# Patient Record
Sex: FEMALE | Race: WHITE | NOT HISPANIC OR LATINO | Employment: FULL TIME | URBAN - METROPOLITAN AREA
[De-identification: names, ages, dates, MRNs, and addresses within clinical notes are randomized per-mention and may not be internally consistent; named-entity substitution may affect disease eponyms.]

---

## 2023-11-28 ENCOUNTER — TELEPHONE (OUTPATIENT)
Age: 28
End: 2023-11-28

## 2023-11-28 NOTE — TELEPHONE ENCOUNTER
I received a Care Request from patient to schedule for:    Where Does it Hurt? L5/S1 bulge  Are you considering joint replacement? No   Are you seeking a second opinion? No  If yes, who is your doctor? I lvm to cb [facility] at [phone].

## 2023-11-28 NOTE — TELEPHONE ENCOUNTER
Caller: Patient    Doctor/Office: [facility]    Call regarding :  Spoke to patient got more information. States she hurt her back 1 year ago snow boarding did eventually go to a doctor took Xrays no MRI said she bulged a disc. It was never confirmed with MRI, does not having imaging since. Having pain in her right leg to the point she cannot sit. She has had issues with sciatic pain since her teen years as well.       Call was transferred to: [facility]

## 2023-11-29 ENCOUNTER — TELEPHONE (OUTPATIENT)
Dept: PHYSICAL THERAPY | Facility: OTHER | Age: 28
End: 2023-11-29

## 2023-11-29 NOTE — TELEPHONE ENCOUNTER
Patient called into [facility] after hours yesterday 11/28 and left v/m due to her back pain . Returned patient's call today 11/29 . V/M left for patient to call back. Phone number and hours of business provided. This is the 1st attempt to reach the patient. NO REF FOR [facility]. Call was transferred by Ortho.     L5-S1 bulging disc

## 2023-12-04 ENCOUNTER — NURSE TRIAGE (OUTPATIENT)
Dept: PHYSICAL THERAPY | Facility: OTHER | Age: 28
End: 2023-12-04

## 2023-12-04 DIAGNOSIS — M54.50 ACUTE RIGHT-SIDED LOW BACK PAIN, UNSPECIFIED WHETHER SCIATICA PRESENT: Primary | ICD-10-CM

## 2023-12-04 DIAGNOSIS — G89.29 ACUTE EXACERBATION OF CHRONIC LOW BACK PAIN: ICD-10-CM

## 2023-12-04 DIAGNOSIS — M54.50 ACUTE EXACERBATION OF CHRONIC LOW BACK PAIN: ICD-10-CM

## 2023-12-04 NOTE — TELEPHONE ENCOUNTER
Additional Information   Negative: Has the patient had unexplained weight loss? Negative: Does the patient have a fever? Negative: Is the patient experiencing blood in sputum? Negative: Has the patient experienced major trauma? (fall from height, high speed collision, direct blow to spine) and is also experiencing nausea, light-headedness, or loss of consciousness? Negative: Is the patient experiencing urine retention? Negative: Is the patient experiencing acute drop foot or paralysis? Affirmative: Is this a chronic condition? Protocols used: [address] [place] [address]    This RN addressed a few complaints with the patient to r/o the need for ED visit. Patient admits to recent snowboarding trip (last Saturday), but denies any trauma. Increased pain severity and other acute s/s noticed shortly after. Patient is currently working and NO RF s/s. Nurse completed triage and NO RF s/s present. Referral entered for the [facility] site and contact/phone number info given to her as well. Strongly encouraged to schedule an evaluation. Patients information was sent to the preferred site and pt made aware clerical would be calling to schedule appointment. Nurse encouraged her to call site if she does not hear from clerical beforehand. Patient Agreed. Patient did not voice any additional questions or concerns at this time. Patient is aware current complaints, relevant dx, additional referrals and treatment/options will be discussed at the evaluation/consult. All information regarding plan to be evaluated by the therapist was reviewed. Patient is in agreement with nurse's explanation of intended care path discussed today. Nurse also offered a call from the [facility] counselor d/t SBT score. Patient declined. Patient was pleasant and appropriate during this encounter. Patient very appreciative of CB and referral placement for the evaluation with an Advanced Spine Therapist/DPT.      Nurse wished her well and reiterated the benefits of participating in a thorough eval. Patient thanked CS staff for assisting her today.
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
## 2023-12-04 NOTE — TELEPHONE ENCOUNTER
This nurse called the patient to proceed with the triage initiated earlier today. Message left stating reason for call, [facility] contact information, and hours of operation. Encouraged patient to CB if she would like to complete the triage and referral process. Referral closed per protocol and will await possible call-back from the patient.

## 2023-12-06 ENCOUNTER — EVALUATION (OUTPATIENT)
Dept: PHYSICAL THERAPY | Facility: CLINIC | Age: 28
End: 2023-12-06
Payer: COMMERCIAL

## 2023-12-06 VITALS
HEART RATE: 76 BPM | OXYGEN SATURATION: 98 % | SYSTOLIC BLOOD PRESSURE: 110 MMHG | TEMPERATURE: 97.9 F | DIASTOLIC BLOOD PRESSURE: 70 MMHG

## 2023-12-06 DIAGNOSIS — G89.29 ACUTE EXACERBATION OF CHRONIC LOW BACK PAIN: ICD-10-CM

## 2023-12-06 DIAGNOSIS — M54.50 ACUTE RIGHT-SIDED LOW BACK PAIN, UNSPECIFIED WHETHER SCIATICA PRESENT: Primary | ICD-10-CM

## 2023-12-06 DIAGNOSIS — M54.50 ACUTE EXACERBATION OF CHRONIC LOW BACK PAIN: ICD-10-CM

## 2023-12-06 PROCEDURE — 97162 PT EVAL MOD COMPLEX 30 MIN: CPT | Performed by: PHYSICAL THERAPIST

## 2023-12-06 NOTE — PROGRESS NOTES
PT Evaluation     Today's date: 2023  Patient name: Loretta Gomez  : 1995  MRN: [id number]  Referring provider: Ameena Malagon PT  Dx:   Encounter Diagnosis     ICD-10-CM    1. Acute right-sided low back pain, unspecified whether sciatica present  M54.50       2. Acute exacerbation of chronic low back pain  M54.50     G89.29                      Assessment  Assessment details: Loretta Gomez is a 34 y.o. female who presents with increased pain consistent with referring diagnosis of Acute right-sided low back pain, unspecified whether sciatica present  (primary encounter diagnosis)  Acute exacerbation of chronic low back pain that is moderately complex secondary to traumatic onset, high fear avoidance and pain levels. Clinically demonstrates signs and symptoms consistent with R sided L4-L5 derrangment with + L4 dermatome parasthesia, + slump signs on R as well as reduced L3-L4 reflexes, decreased lumbar ROM with peripheralization with opening and flexion, decreased core and limited R hip strength, decreased postural proprioception leading to pain with ADLs and exercise. This suggests the need for skilled OPPT to address the above listed impairments, achieve established goals and return to PLOF pain-free. If you have any questions or concerns please contact me at [phone]. Thank you!      Impairments: abnormal coordination, abnormal gait, abnormal muscle firing, abnormal muscle tone, abnormal or restricted ROM, difficulty understanding, impaired physical strength, lacks appropriate home exercise program, pain with function, scapular dyskinesis, weight-bearing intolerance and poor body mechanics    Symptom irritability: moderateUnderstanding of Dx/Px/POC: good   Prognosis: good    Goals  Short Term Goals (4 weeks)  1.) Establish independence with HEP  2.) Decrease subjective pain levels from NPRS at least to 2-5/10 at rest and with activity  3.) Improve lumbar ROM at least 5-10 degrees into all planes to allow for improved ease of movement with less guarding    Long Term Goals (8 weeks)  1.) Improve lumbar ROM to WNL in all planes to restore normal movement with ADLs and function  2.) Improve hip ABD and ER strength to 5/5 in all planes in order to return to pain-free ADLs and function  3.) Improve FOTO score at least to 75 points showing improved self reported disability        Plan  Plan details: Initiate POC for L/S stability; monitor sxs and progress as able   Patient would benefit from: PT eval and skilled physical therapy  Planned modality interventions: biofeedback, cryotherapy, electrical stimulation/Russian stimulation and TENS  Planned therapy interventions: abdominal trunk stabilization, activity modification, ADL retraining, ADL training, balance, balance/weight bearing training, breathing training, body mechanics training, compression, coordination, flexibility, functional ROM exercises, gait training, graded activity, graded exercise, graded motor, home exercise program, IADL retraining, transfer training, therapeutic training, therapeutic exercise, therapeutic activities, stretching, strengthening, self care, sensory integrative techniques, patient education, postural training, neuromuscular re-education, nerve gliding, muscle pump exercises, motor coordination training, massage, manual therapy, kinesiology taping, IASTM and joint mobilization  Frequency: 2x week  Duration in visits: 16  Duration in weeks: 8  Plan of Care beginning date: 12/6/2023  Plan of Care expiration date: 2/7/2024  Treatment plan discussed with: patient      Subjective Evaluation    History of Present Illness  Date of onset: 11/15/2022  Mechanism of injury: Ed Walters is a 34 y.o. female who presents with increased R sided LBP starting ~12 months ago with snow boarding where she landed on her tailbone and had major difficulty with moving her R LE. Denies any MRIs or prior imaging of her L/S .   Decided to seek out MD consult last week where she was sitting at her desk where she has to lean to the L to get pressure off of her hip. Notes that her toes were tingling referral for [facility] program was provided. Reports having occasional burning searing R hip foot and low back pain at night. Slight functional incontinence with coughing but denies any difference in bowel movements. Reports somewhat constant R LE NT signs or sxs improved with back laying and side lying or prone. Denies any PCP. Wishes to return to PLOF pain-free get back to lifting, hiking and snowboarding, trail running.               Recurrent probem    Quality of life: good    Patient Goals  Patient goals for therapy: decreased edema, decreased pain, improved balance, increased motion, increased strength, independence with ADLs/IADLs and return to sport/leisure activities  Patient goal: Get back to being fit and active  Pain  Current pain rating: 3  At best pain ratin  At worst pain rating: 10  Location: L/S  Quality: burning, sharp, needle-like, dull ache and radiating  Relieving factors: rest, support and change in position  Aggravating factors: sitting, standing, walking, stair climbing, running and lifting  Progression: worsening    Social Support  Steps to enter house: yes  4  Stairs in house: no   Lives in: apartment  Lives with: significant other    Employment status: working ()  Hand dominance: right  Exercise history: 2 days a week lifting  Life stress: High      Diagnostic Tests  X-ray: abnormal  Treatments  Previous treatment: physical therapy (B/L hips)  Current treatment: physical therapy      Objective     Neurological Testing     Sensation     Lumbar   Left   Intact: light touch    Right   Diminished: light touch    Comments   Right light touch: L4    Reflexes   Left   Patellar (L4): normal (2+)  Clonus sign: negative    Right   Patellar (L4): trace (1+)  Clonus sign: negative    Active Range of Motion Lumbar   Flexion: 95 degrees   Extension: 25 degrees   Left lateral flexion: 45 degrees     Right lateral flexion: 45 degrees   Left rotation: 80 degrees   Right rotation: 76 degrees     Additional Active Range of Motion Details  Hip Er R 35 degrees vs L 47 degrees   Hip IR R 47; 60 degrees on L     HS R 28 degrees vs L 35 degrees     Strength/Myotome Testing     Left Hip   Planes of Motion   Flexion: 5  Abduction: 4+  Adduction: 5  External rotation: 5  Internal rotation: 5    Right Hip   Planes of Motion   Flexion: 5  Abduction: 4-  Adduction: 5  External rotation: 4  Internal rotation: 5    Left Knee   Flexion: 5  Extension: 5    Right Knee   Flexion: 5  Extension: 5    Left Ankle/Foot   Dorsiflexion: 5  Plantar flexion: 5  Inversion: 5  Eversion: 5    Right Ankle/Foot   Dorsiflexion: 5  Plantar flexion: 5  Inversion: 5  Eversion: 5    Tests   Pain with max opening      Functional Assessment      Squat    Left within functional limits, right within functional limits, left varus and right varus. Forward Step Down 8"   Left Leg  Within functional limits. Right Leg  Valgus. Single Leg Stance   Left: 20 seconds  Right: 15 seconds             Precautions: Chronic pain  EPOC: 2/7/24  HEP: Access Code: [email or web address]  URL: [email or web address]  Date: 12/06/2023  Prepared by: Alma Ovalles    Exercises  - Static Prone on Elbows  - 1 x daily - 7 x weekly - 3 sets - 10 reps  - Seated Hamstring Stretch  - 1 x daily - 7 x weekly - 3 sets - 10 reps  - Bird Dog  - 1 x daily - 7 x weekly - 3 sets - 10 reps  - Sidelying Hip Abduction  - 1 x daily - 7 x weekly - 3 sets - 10 reps  - Standard Plank  - 1 x daily - 7 x weekly - 3 sets - 10 reps    Manuals 12/6            CPA to L/S             B/L HS stretch             R piriformis                           Neuro Re-Ed             NSP march             NSP heel slide             NSP 90/90                                                                 Ther Ex Hip ABD             CATHERINE Cody             Lateral step down                                                                 Ther Activity             TM lunge                           Gait Training                                       Modalities

## 2023-12-15 ENCOUNTER — APPOINTMENT (OUTPATIENT)
Dept: PHYSICAL THERAPY | Facility: CLINIC | Age: 28
End: 2023-12-15
Payer: COMMERCIAL

## 2023-12-22 ENCOUNTER — APPOINTMENT (OUTPATIENT)
Dept: PHYSICAL THERAPY | Facility: CLINIC | Age: 28
End: 2023-12-22
Payer: COMMERCIAL

## 2024-07-22 ENCOUNTER — OFFICE VISIT (OUTPATIENT)
Dept: INTERNAL MEDICINE CLINIC | Facility: CLINIC | Age: 29
End: 2024-07-22
Payer: COMMERCIAL

## 2024-07-22 VITALS
HEART RATE: 74 BPM | OXYGEN SATURATION: 98 % | BODY MASS INDEX: 27.4 KG/M2 | SYSTOLIC BLOOD PRESSURE: 120 MMHG | HEIGHT: 69 IN | DIASTOLIC BLOOD PRESSURE: 88 MMHG | WEIGHT: 185 LBS

## 2024-07-22 DIAGNOSIS — Z11.4 SCREENING FOR HIV (HUMAN IMMUNODEFICIENCY VIRUS): ICD-10-CM

## 2024-07-22 DIAGNOSIS — F41.1 GAD (GENERALIZED ANXIETY DISORDER): ICD-10-CM

## 2024-07-22 DIAGNOSIS — Z00.00 ROUTINE GENERAL MEDICAL EXAMINATION AT A HEALTH CARE FACILITY: ICD-10-CM

## 2024-07-22 DIAGNOSIS — F43.12 CHRONIC POST-TRAUMATIC STRESS DISORDER (PTSD): ICD-10-CM

## 2024-07-22 DIAGNOSIS — Z00.00 WELLNESS EXAMINATION: Primary | ICD-10-CM

## 2024-07-22 DIAGNOSIS — E55.9 VITAMIN D DEFICIENCY: ICD-10-CM

## 2024-07-22 DIAGNOSIS — Z12.4 SCREENING FOR CERVICAL CANCER: ICD-10-CM

## 2024-07-22 DIAGNOSIS — F41.0 PANIC DISORDER (EPISODIC PAROXYSMAL ANXIETY): ICD-10-CM

## 2024-07-22 DIAGNOSIS — R21 RASH: ICD-10-CM

## 2024-07-22 DIAGNOSIS — F42.2 MIXED OBSESSIONAL THOUGHTS AND ACTS: ICD-10-CM

## 2024-07-22 DIAGNOSIS — Z11.59 NEED FOR HEPATITIS C SCREENING TEST: ICD-10-CM

## 2024-07-22 PROBLEM — F42.9 OCD (OBSESSIVE COMPULSIVE DISORDER): Status: ACTIVE | Noted: 2024-03-18

## 2024-07-22 PROCEDURE — 99385 PREV VISIT NEW AGE 18-39: CPT | Performed by: INTERNAL MEDICINE

## 2024-07-22 RX ORDER — ALPRAZOLAM 0.25 MG/1
TABLET ORAL
COMMUNITY
Start: 2024-03-18

## 2024-07-22 RX ORDER — BUSPIRONE HYDROCHLORIDE 10 MG/1
TABLET ORAL
COMMUNITY
Start: 2024-07-18

## 2024-07-22 RX ORDER — PRAZOSIN HYDROCHLORIDE 1 MG/1
1 CAPSULE ORAL
COMMUNITY

## 2024-07-22 NOTE — PROGRESS NOTES
Ambulatory Visit  Name: Fay Montgomery      : 1995      MRN: [id number]  Encounter Provider: Ag Perry MD  Encounter Date: 2024   Encounter department: [facility]    Assessment & Plan   1. Wellness examination  Assessment & Plan:  Discussed preventative health, cancer screening, immunizations, and safety issues.  I recommend Tdap vaccination if not done within the past 10 years.  I recommend yearly flu shot.  I recommend some screening labs.  2. Need for hepatitis C screening test  -     Hepatitis C Antibody; Future  3. Screening for cervical cancer  4. FREDI (generalized anxiety disorder)  Assessment & Plan:  Continue meds and follow-up psychiatry  5. Mixed obsessional thoughts and acts  Assessment & Plan:  Continue meds and follow-up psychiatry  6. Panic disorder (episodic paroxysmal anxiety)  Assessment & Plan:  Continue meds and follow-up psychiatry  7. Chronic post-traumatic stress disorder (PTSD)  Assessment & Plan:  Continue meds and follow-up psychiatry  8. Screening for HIV (human immunodeficiency virus)  -     HIV 1/2 AG/AB w Reflex [facility] for 2 yr old and above; Future  9. Routine general medical examination at a health care facility  -     CBC and differential; Future  -     Comprehensive metabolic panel; Future  -     Lipid Panel with Direct LDL reflex; Future  -     TSH, 3rd generation with Free T4 reflex; Future  10. Vitamin D deficiency  -     Vitamin D 25 hydroxy; Future  11. Rash  -     Ambulatory Referral to Dermatology; Future      Depression Screening and Follow-up Plan: Patient's depression screening was positive with a PHQ-2 score of 5. Their PHQ-9 score was 17. Continue regular follow-up with their mental health provider who is managing their mental health condition(s).         History of Present Illness     Pt here to establish care.  She is seeing psychiatry for anxiety, PTSD, OCD, and panic disorder.  Patient also has a concern about rash.  No  elton.  Pt has had it all her life, but it has grown over the past few months.  Located right upper arm.    Wellness: Patient gets routine dental care, no smoking cigarettes, patient eats a healthy diet and exercises, likes being outdoors.      Review of Systems   Constitutional:  Negative for chills, fatigue and fever.   HENT:  Negative for congestion, nosebleeds, postnasal drip, sore throat and trouble swallowing.    Eyes:  Negative for pain.   Respiratory:  Negative for cough, chest tightness, shortness of breath and wheezing.    Cardiovascular:  Negative for chest pain, palpitations and leg swelling.   Gastrointestinal:  Positive for constipation. Negative for abdominal pain, diarrhea, nausea and vomiting.   Endocrine: Negative for polydipsia and polyuria.   Genitourinary:  Negative for dysuria, flank pain and hematuria.   Musculoskeletal:  Negative for arthralgias.   Skin:  Positive for rash.   Neurological:  Positive for light-headedness (when getting up too fast). Negative for dizziness, tremors and headaches.   Hematological:  Does not bruise/bleed easily.   Psychiatric/Behavioral:  Positive for dysphoric mood. Negative for confusion. The patient is nervous/anxious.      Past Medical History:   Diagnosis Date   • Anemia Childhood   • Anxiety Childhood   • Depression Childhood   • Eating disorder Childhood (12/13 years old)   • GERD (gastroesophageal reflux disease) Childhood   • Pneumonia    • Urinary tract infection      Past Surgical History:   Procedure Laterality Date   • NO PAST SURGERIES       Family History   Problem Relation Age of Onset   • Alcohol abuse Mother    • Mental illness Mother         Assumed bipolar disorder   • Thyroid disease Mother    • Autoimmune disease Mother         Lupus   • Bipolar disorder Mother    • Alcohol abuse Father    • Mental illness Father         Anxiety   • Substance Abuse Maternal Grandmother         xanax   • Mental illness Maternal Grandmother         Anxiety  "  • Stroke Maternal Grandmother    • Coronary artery disease Paternal Grandfather    • COPD Paternal Grandfather    • Cancer Paternal Grandfather         Lung cancer   • Mental illness Paternal Aunt         Anxiety   • Asthma Brother         Had when he was a child   • Autoimmune disease Maternal Aunt         Graves disease, Still's disease, lupus, celiac disease   • Autoimmune disease Maternal Aunt         Chron's disease, Celiac disease     Social History     Tobacco Use   • Smoking status: Passive Smoke Exposure - Never Smoker   • Smokeless tobacco: Never   Vaping Use   • Vaping status: Never Used   Substance and Sexual Activity   • Alcohol use: Not Currently     Comment: Maybe one drink every 3 or so months   • Drug use: Yes     Frequency: 7.0 times per week     Types: Marijuana     Comment: Medical marijuana patient, smokes every day   • Sexual activity: Yes     Partners: Male     Birth control/protection: Condom Male     Current Outpatient Medications on File Prior to Visit   Medication Sig   • ALPRAZolam (XANAX) 0.25 mg tablet    • busPIRone (BUSPAR) 10 mg tablet    • prazosin (MINIPRESS) 1 mg capsule Take 1 mg by mouth daily at bedtime     Allergies   Allergen Reactions   • Bee Venom Swelling   • Norgestim-Eth Estrad Triphasic Abdominal Pain and Hives   • Norgestimate-Eth Estradiol Other (See Comments)   • Poison Ivy Extract Hives, Palpitations, Rash, Swelling and Tachycardia     Immunization History   Administered Date(s) Administered   • COVID-19 MODERNA VACC 0.5 ML IM 04/09/2021, 05/07/2021     Objective     /88   Pulse 74   Ht 5' 9\" (1.753 m)   Wt 83.9 kg (185 lb)   SpO2 98%   BMI 27.32 kg/m²     Physical Exam  Vitals reviewed.   Constitutional:       Appearance: Normal appearance. She is well-developed.   HENT:      Head: Normocephalic and atraumatic.      Right Ear: External ear normal.      Left Ear: External ear normal.      Nose: Nose normal.      Mouth/Throat:      Mouth: Mucous " membranes are moist.      Pharynx: No oropharyngeal exudate or posterior oropharyngeal erythema.   Eyes:      General: No scleral icterus.     Conjunctiva/sclera: Conjunctivae normal.   Neck:      Thyroid: No thyromegaly.      Trachea: No tracheal deviation.   Cardiovascular:      Rate and Rhythm: Normal rate and regular rhythm.      Heart sounds: Normal heart sounds. No murmur heard.  Pulmonary:      Effort: No respiratory distress.      Breath sounds: Normal breath sounds. No wheezing or rales.   Abdominal:      General: Bowel sounds are normal.      Palpations: Abdomen is soft. There is no mass.      Tenderness: There is no abdominal tenderness. There is no guarding.   Musculoskeletal:      Cervical back: Normal range of motion and neck supple.      Right lower leg: No edema.      Left lower leg: No edema.   Lymphadenopathy:      Cervical: No cervical adenopathy.   Skin:     Coloration: Skin is not jaundiced or pale.      Findings: No rash (no rash appreciated).   Neurological:      General: No focal deficit present.      Mental Status: She is alert and oriented to person, place, and time.   Psychiatric:         Mood and Affect: Mood normal.         Behavior: Behavior normal.         Thought Content: Thought content normal.         Judgment: Judgment normal.

## 2024-07-22 NOTE — ASSESSMENT & PLAN NOTE
Discussed preventative health, cancer screening, immunizations, and safety issues.  I recommend Tdap vaccination if not done within the past 10 years.  I recommend yearly flu shot.  I recommend some screening labs.

## 2024-07-22 NOTE — PATIENT INSTRUCTIONS
Problem List Items Addressed This Visit          Behavioral Health    FREDI (generalized anxiety disorder)     Continue meds and follow-up psychiatry         Relevant Medications    busPIRone (BUSPAR) 10 mg tablet    ALPRAZolam (XANAX) 0.25 mg tablet    OCD (obsessive compulsive disorder)     Continue meds and follow-up psychiatry         Relevant Medications    busPIRone (BUSPAR) 10 mg tablet    ALPRAZolam (XANAX) 0.25 mg tablet    Panic disorder (episodic paroxysmal anxiety)     Continue meds and follow-up psychiatry         Relevant Medications    busPIRone (BUSPAR) 10 mg tablet    ALPRAZolam (XANAX) 0.25 mg tablet    Chronic post-traumatic stress disorder (PTSD)     Continue meds and follow-up psychiatry         Relevant Medications    busPIRone (BUSPAR) 10 mg tablet    ALPRAZolam (XANAX) 0.25 mg tablet       Other    Wellness examination - Primary     Discussed preventative health, cancer screening, immunizations, and safety issues.  I recommend Tdap vaccination if not done within the past 10 years.  I recommend yearly flu shot.  I recommend some screening labs.          Other Visit Diagnoses       Need for hepatitis C screening test        Relevant Orders    Hepatitis C Antibody    Screening for cervical cancer        Screening for HIV (human immunodeficiency virus)        Relevant Orders    HIV 1/2 AG/AB w Reflex [facility] for 2 yr old and above    Routine general medical examination at a health care facility        Relevant Orders    CBC and differential    Comprehensive metabolic panel    Lipid Panel with Direct LDL reflex    TSH, 3rd generation with Free T4 reflex    Vitamin D deficiency        Relevant Orders    Vitamin D 25 hydroxy    Rash        Relevant Orders    Ambulatory Referral to Dermatology

## 2024-08-07 ENCOUNTER — OFFICE VISIT (OUTPATIENT)
Age: 29
End: 2024-08-07
Payer: COMMERCIAL

## 2024-08-07 VITALS
DIASTOLIC BLOOD PRESSURE: 76 MMHG | BODY MASS INDEX: 27.11 KG/M2 | WEIGHT: 183 LBS | HEIGHT: 69 IN | SYSTOLIC BLOOD PRESSURE: 118 MMHG

## 2024-08-07 DIAGNOSIS — F32.81 PMDD (PREMENSTRUAL DYSPHORIC DISORDER): Primary | ICD-10-CM

## 2024-08-07 DIAGNOSIS — N80.9 ENDOMETRIOSIS: ICD-10-CM

## 2024-08-07 DIAGNOSIS — N80.9 ENDOMETRIOSIS DETERMINED BY LAPAROSCOPY: ICD-10-CM

## 2024-08-07 PROCEDURE — 99204 OFFICE O/P NEW MOD 45 MIN: CPT | Performed by: OBSTETRICS & GYNECOLOGY

## 2024-08-08 PROBLEM — F32.81 PMDD (PREMENSTRUAL DYSPHORIC DISORDER): Status: ACTIVE | Noted: 2024-08-08

## 2024-08-08 PROBLEM — N80.9 ENDOMETRIOSIS DETERMINED BY LAPAROSCOPY: Status: ACTIVE | Noted: 2024-08-08
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
## 2024-08-14 ENCOUNTER — OFFICE VISIT (OUTPATIENT)
Age: 29
End: 2024-08-14
Payer: COMMERCIAL

## 2024-08-14 DIAGNOSIS — F32.81 PMDD (PREMENSTRUAL DYSPHORIC DISORDER): Primary | ICD-10-CM

## 2024-08-14 DIAGNOSIS — N80.9 ENDOMETRIOSIS: ICD-10-CM

## 2024-08-14 PROCEDURE — 99215 OFFICE O/P EST HI 40 MIN: CPT | Performed by: OBSTETRICS & GYNECOLOGY

## 2024-08-18 PROBLEM — Z00.00 WELLNESS EXAMINATION: Status: RESOLVED | Noted: 2024-07-22 | Resolved: 2024-08-18

## 2024-08-18 RX ORDER — NORETHINDRONE ACETATE 5 MG
TABLET ORAL
Qty: 30 TABLET | Refills: 3 | Status: SHIPPED | OUTPATIENT
Start: 2024-08-18

## 2024-08-18 NOTE — PROGRESS NOTES
Assessment/Plan:      Diagnoses and all orders for this visit:    PMDD (premenstrual dysphoric disorder)    Endometriosis  -     norethindrone (Aygestin) 5 mg tablet; Take one pill by mouth continuously        1) Discussed the physiology of the menstrual cycle, the luteal phase hormonal drop that is the likely cause of PMDD. Pregnancy causes a hormonal rise, a different pathophysiology, but he psychiatric illness is obviously impacted by any hormonal swing.  2) I do NOT recommend ovarian castration, abrupt hormonal loss could be devastating at her age even with hormone replacement.  3) I would prefer Depo Lupron for long term hormonal suppression with eventual add back therapy. This can be done with Myfembree ( likely not covered by insurance but will see), or add back with BOTH estradiol and progesterone. She is interested in this option, but in light of impending marriage and honeymoon, will need to be delayed for a couple months. Agree with POP, she is menstruating now, recommend to start NOW and do continuous use. I will add more pills to her existing script  4) She asks about concurrent use of actual micronized progesterone for mood stabilization on top of POP. This is not really needed and would be a very off label use. If needed could try a couple weeks of this just to see how this impacts mood. She will let me know if she really needs this.  5) Discussed plan of care with Dr. Mcgrath, would prefer she guides Lupron as she has more office availability than I, but that I could oversee add back therapy as needed.  6) Follow up prn.     This was a 50 minute visit with greater than 50% of time spent in face to face counseling and coordination of care.    Subjective:     Patient ID: Fay Montgomery is a 28 y.o. female.    Fay presents for second opinion hormone consult, her first visit with me; referred by Dr. Mcgrath with [facility].  Fay is obviously premenopausal, Condoms for contraception. Her complicated  "story :  1) Fay knows she has suffered from profound and severe PMDD since adolescence.   2) Hx of severe childhood trauma, so much so that she \" knows she can never be a mother\", under the care of Dr. Jessica Cota with psychiatry. Many antidepressants, anxiolytics have been tried, currently on xanax, buspar. This trauma augments PMDD greatly.  3) Had unintended pregnancy in 9/23 and immediately had profound mood disturbance with suicidal ideation. S/p EAB for relief. She wants her ovaries removed because of this, so traumatized by this event, also because...  4) HX of endometriosis with chronic and cyclic pelvic pain for years. Many ER visits for pain until finally diagnosed via DxLS in 2018. Pain initially improved s/p fulguration of endometriosis, but has slowly returned. Offered Orilissa, did not take. Dr. Gino Sanchez performed this surgery in [place].   5) Scheduled to be  next month. Dr. Mcgrath offered 1 month of POP for contraception as well as hormonal suppression, but wanted my advice for long term management.   PMXH: as above. She has \"endometrial ablation\" listed in history but this is incorrect. It should be \"fulguration of endometriosis\"  SHX: denies tobacco, ETOH  FHX: non contributory    Review of Systems   Constitutional: Negative.    Gastrointestinal:  Positive for abdominal distention.   Endocrine: Negative.    Genitourinary:  Positive for pelvic pain.   Musculoskeletal: Negative.    Skin: Negative.    Psychiatric/Behavioral:  Positive for agitation, decreased concentration, dysphoric mood, sleep disturbance and suicidal ideas. The patient is nervous/anxious.        Objective:     Physical Exam  Constitutional:       Appearance: Normal appearance.   Neurological:      Mental Status: She is alert.         "

## 2024-09-11 DIAGNOSIS — N80.9 ENDOMETRIOSIS: ICD-10-CM

## 2024-10-04 ENCOUNTER — TELEPHONE (OUTPATIENT)
Age: 29
End: 2024-10-04

## 2024-10-04 DIAGNOSIS — N80.9 ENDOMETRIOSIS DETERMINED BY LAPAROSCOPY: Primary | ICD-10-CM

## 2024-10-04 DIAGNOSIS — F32.81 PMDD (PREMENSTRUAL DYSPHORIC DISORDER): ICD-10-CM

## 2024-10-07 ENCOUNTER — TELEPHONE (OUTPATIENT)
Dept: OBGYN CLINIC | Facility: CLINIC | Age: 29
End: 2024-10-07

## 2024-10-08 ENCOUNTER — OFFICE VISIT (OUTPATIENT)
Dept: OBGYN CLINIC | Facility: CLINIC | Age: 29
End: 2024-10-08
Payer: COMMERCIAL

## 2024-10-08 ENCOUNTER — APPOINTMENT (OUTPATIENT)
Dept: RADIOLOGY | Facility: CLINIC | Age: 29
End: 2024-10-08
Payer: COMMERCIAL

## 2024-10-08 VITALS
BODY MASS INDEX: 27.4 KG/M2 | SYSTOLIC BLOOD PRESSURE: 112 MMHG | DIASTOLIC BLOOD PRESSURE: 62 MMHG | WEIGHT: 185 LBS | HEIGHT: 69 IN

## 2024-10-08 DIAGNOSIS — S83.206A POSITIVE MCMURRAY TEST OF RIGHT KNEE, INITIAL ENCOUNTER: ICD-10-CM

## 2024-10-08 DIAGNOSIS — M23.91 ACUTE INTERNAL DERANGEMENT OF RIGHT KNEE: ICD-10-CM

## 2024-10-08 DIAGNOSIS — M25.561 ACUTE PAIN OF RIGHT KNEE: Primary | ICD-10-CM

## 2024-10-08 DIAGNOSIS — M25.561 KNEE MENISCUS PAIN, RIGHT: ICD-10-CM

## 2024-10-08 DIAGNOSIS — M25.461 EFFUSION OF RIGHT KNEE: ICD-10-CM

## 2024-10-08 DIAGNOSIS — M25.561 ACUTE PAIN OF RIGHT KNEE: ICD-10-CM

## 2024-10-08 PROCEDURE — 73564 X-RAY EXAM KNEE 4 OR MORE: CPT

## 2024-10-08 PROCEDURE — 99203 OFFICE O/P NEW LOW 30 MIN: CPT | Performed by: PHYSICIAN ASSISTANT

## 2024-10-08 NOTE — PATIENT INSTRUCTIONS
"Patient Education     Meniscal tear   The Basics   Written by the doctors and editors at [facility]   What is a meniscal tear? -- A meniscal tear is a condition that causes knee pain and other knee symptoms. It happens when a part of the knee joint called the \"meniscus\" tears. The meniscus is the cushion of rubbery material (cartilage) inside the knee, between the thigh bone and shin bone (figure 1).  There are different kinds of meniscal tears, depending on the part of the cartilage that tears and how it tears.  A meniscal tear can happen suddenly, such as during a sports injury. It can also happen slowly over time. This is more common in older adults.  What are the symptoms of a meniscal tear? -- Symptoms of a meniscal tear can include:   Knee pain   Knee swelling (from a collection of fluid inside the knee)   The knee locks, does not straighten all the way, or feels like it \"catches\" on something as it straightens   The knee gives out, feels unstable, or feels like it won't support you   Knee stiffness  Is there a test for a meniscal tear? -- Yes. Your doctor or nurse will ask about your symptoms and do an exam to check how your knee and leg move.  Your doctor or nurse might order an X-ray of your knee. They might also order an imaging test such as an MRI scan. Imaging tests create pictures of the inside of the body.  If your doctor is still unsure if you have a meniscal tear, they might recommend surgery called \"arthroscopy.\" This lets the doctor look inside your knee at the meniscus. During arthroscopy, the doctor makes a few small cuts around the knee. Then they insert long, thin tools into the knee joint. One tool has a camera on the end (figure 2). It sends pictures to a TV screen that the doctor sees. If you have a meniscal tear, the doctor can use the tools to treat it.  How is a meniscal tear treated? -- Treatment depends on which part of the meniscus is torn, how small or large the tear is, how long you " have had the tear, your symptoms, and your individual situation.  Treatment for a meniscal tear usually involves:   Resting your knee - Avoid movements that worsen the pain. Try not to squat, kneel, or run.   Raising your knee above the level of your heart, for example, by propping it up on pillows - This is helpful only for the first few days after an injury.   Putting ice on your knee - Put a cold gel pack, bag of ice, or bag of frozen vegetables on the knee every 1 to 2 hours, for 15 minutes each time. Put a thin towel between the ice (or other cold object) and your skin. Use the ice (or other cold object) for at least 6 hours after the injury. Some people find it helpful to ice up to 2 days after the injury. It can also help after doing activities that make your pain worse.   Using crutches to walk, if you have severe pain   Wearing a knee brace, if your knee feels unstable   Taking a pain-relieving medicine, such as acetaminophen (sample brand name: Tylenol) or ibuprofen (sample brand names: Advil, Motrin)   Doing exercises to strengthen the muscles around your knee - After your pain improves, your doctor or nurse will show you exercises to do. They might also have you work with a physical therapist (exercise expert).  In many cases, symptoms improve with this treatment. But if you have a large tear, or your symptoms don't improve, your doctor might recommend surgery. During surgery, the doctor will treat the tear. This might involve removing part or all of your meniscus or repairing the tear. Surgery is usually not helpful for people who are middle-aged or older and whose meniscus has worn down over time.  How long does a meniscal tear take to heal? -- It depends on the type of tear and the person's age and health. Most meniscal tears do not heal completely. Small tears usually do not cause major problems, but large tears might require surgery.  If you have a meniscal tear that has happened slowly over time,  "it probably will not heal completely. But physical therapy can help you strengthen your knee and feel better.  When should I call my doctor or nurse? -- After treatment, your doctor or nurse will tell you when to call. In general, you should call if:   You have severe pain, or your pain or swelling gets worse   You have a fever with knee pain, swelling, and redness   You have numbness or tingling in the lower leg, foot, or toes   Your knee locks and you can't get it unlocked  What else should I know? -- People with a meniscal tear have a higher chance of getting a condition called osteoarthritis later on. Osteoarthritis is the most common form of arthritis, which is the general term for inflammation of the joints. Osteoarthritis can cause joint pain, stiffness, and swelling.  All topics are updated as new evidence becomes available and our peer review process is complete.  This topic retrieved from [employer] on: Feb 26, 2024.  Topic 80353 Version 9.0  Release: 32.2.4 - C32.56  © 2024 UpToDate, Inc. and/or its affiliates. All rights reserved.  figure 1: Front view of the knee     This drawing shows the inner parts of the knee as seen from the front. A small bone (called the patella or the \"knee cap\") that sits in front of the knee has been removed so that you can see what is under that bone. The anterior cruciate ligament (ACL) is in the middle in white. It connects the thigh bone (called the \"femur\") to the shin bone (called the \"tibia\"). The meniscus is a cushion of rubbery material (cartilage) between the thigh bone and the shin bone.  Graphic 18755 Version 5.0  figure 2: Knee arthroscopy     This drawing shows knee arthroscopy. This type of surgery can also be used to repair or replace joints in the shoulder or hip.  Graphic 44391 Version 4.0  Consumer Information Use and Disclaimer   Disclaimer: This generalized information is a limited summary of diagnosis, treatment, and/or medication information. It is not " meant to be comprehensive and should be used as a tool to help the user understand and/or assess potential diagnostic and treatment options. It does NOT include all information about conditions, treatments, medications, side effects, or risks that may apply to a specific patient. It is not intended to be medical advice or a substitute for the medical advice, diagnosis, or treatment of a health care provider based on the health care provider's examination and assessment of a patient's specific and unique circumstances. Patients must speak with a health care provider for complete information about their health, medical questions, and treatment options, including any risks or benefits regarding use of medications. This information does not endorse any treatments or medications as safe, effective, or approved for treating a specific patient. UpToDate, Inc. and its affiliates disclaim any warranty or liability relating to this information or the use thereof.The use of this information is governed by the Terms of Use, available at https://www.woltersTrunk Clubuwer.com/en/know/clinical-effectiveness-terms. 2024© UpToDate, Inc. and its affiliates and/or licensors. All rights reserved.  Copyright   © 2024 UpToDate, Inc. and/or its affiliates. All rights reserved.

## 2024-10-08 NOTE — PROGRESS NOTES
Orthopaedic Surgery - Office Note  Fay Montgomery (29 y.o. female)   : 1995   MRN: [id number]  Encounter Date: 10/8/2024    Chief Complaint   Patient presents with    Right Knee - Swelling, Pain         Assessment/Plan  Diagnoses and all orders for this visit:    Acute pain of right knee  -     XR knee 4+ vw right injury; Future  -     MRI knee right  wo contrast; Future  -     Ambulatory Referral to Physical Therapy; Future  -     Ambulatory Referral to Orthopedic Surgery; Future    Effusion of right knee  -     MRI knee right  wo contrast; Future  -     Ambulatory Referral to Physical Therapy; Future  -     Ambulatory Referral to Orthopedic Surgery; Future    Acute internal derangement of right knee  -     MRI knee right  wo contrast; Future  -     Ambulatory Referral to Physical Therapy; Future  -     Ambulatory Referral to Orthopedic Surgery; Future    Knee meniscus pain, right  -     MRI knee right  wo contrast; Future  -     Ambulatory Referral to Physical Therapy; Future  -     Ambulatory Referral to Orthopedic Surgery; Future    Positive Kirill test of right knee, initial encounter  -     Ambulatory Referral to Physical Therapy; Future  -     Ambulatory Referral to Orthopedic Surgery; Future    The diagnosis as well as treatment options were reviewed with the patient in the office today.  Due to the acute injury and mechanical symptoms with joint effusion I would recommend an MRI to evaluate for medial meniscus tear.  She will begin icing the knee 20 minutes on 1 hour off 3 times a day and may use an oral anti-inflammatory such as Aleve 1 tablet twice daily with food stopping and calling if any stomach upset occurs.  I would recommend starting physical therapy to work on optimizing range of motion, strength, and decreasing swelling.  She will return to discuss the MRI with orthopedic surgeon performing knee arthroscopies.  She was advised some degree of her symptoms are related to patellofemoral  "pain which may benefit from physical therapy as well but I would like to get the MRI to evaluate the medial knee pain.     Return for Recheck with surgeon performing knee arthroscopies to discuss MRI.        History of Present Illness  This is a new patient with right knee pain.  Patient reports that she has over 10 years of right knee pain.  She reports it started in high school while running track.  She describes anterior knee pain consistent with patellar tendinitis and patellofemoral syndrome.  She reports that she then tried running programs on and off that usually resulted in knee pain.  Most recently she got  and was doing some hiking 2 weeks ago in [place] when she developed significant increase in medial knee pain which was sharp and mechanical with associated pain and swelling.  She has had pain with ambulation since that time.    She reports a contributing medical history of chronic back pain with right-sided lumbar disc pathology for which she has never had follow-up care.  She is unsure how this relates to her chronic right knee pain.    Review of Systems  Pertinent items are noted in HPI.  All other systems were reviewed and are negative.    Physical Exam  /62 (BP Location: Left arm, Patient Position: Sitting, Cuff Size: Large)   Ht 5' 9\" (1.753 m) Comment: verbal  Wt 83.9 kg (185 lb)   BMI 27.32 kg/m²   Cons: Appears well.  No apparent distress.  Psych: Alert. Oriented x3.  Mood and affect normal.    Patient's right knee is with 1+ effusion today in the office.  She is tender on the medial joint line.  She has a positive medial Kirill's.  She is nontender in the lateral joint line and has a negative lateral Kirill's.  She has no pain or instability with valgus and varus stressing.  She is tender on the medial and lateral border of the patella.  She has positive patellar apprehension.  There is mild retropatellar crepitus.  Her quad and hamstring strength are both decreased to 4 out " of 5.  She has no calf tenderness and a negative Homans.  She has full extension and flexes to 130 degrees.  Her gait is heel-to-toe.  She has no prepatellar bursitis.          Studies Reviewed  Independent review of the x-rays performed in the office today by myself show no acute fractures or dislocations.  Joint spaces are well-maintained.  Suspected joint effusion noted.  X-rays were reviewed from orthopedic standpoint will await official radiologist interpretation    Procedures  No procedures today.    Medical, Surgical, Family, and Social History  The patient's medical history, family history, and social history, were reviewed and updated as appropriate.    Past Medical History:   Diagnosis Date    Anemia Childhood    Anxiety Childhood    Depression Childhood    Eating disorder Childhood (12/13 years old)    Endometriosis 09/2018    Excision preformed    GERD (gastroesophageal reflux disease) Childhood    Migraine     Pneumonia     Urinary tract infection        Past Surgical History:   Procedure Laterality Date    NO PAST SURGERIES      PELVIC LAPAROSCOPY      Fulguration of endometriosis       Family History   Problem Relation Age of Onset    Alcohol abuse Mother     Mental illness Mother         Assumed bipolar disorder    Thyroid disease Mother     Autoimmune disease Mother         Lupus    Bipolar disorder Mother     Alcohol abuse Father     Mental illness Father         Anxiety    Substance Abuse Maternal Grandmother         xanax    Mental illness Maternal Grandmother         Anxiety    Stroke Maternal Grandmother     Coronary artery disease Paternal Grandfather     COPD Paternal Grandfather     Cancer Paternal Grandfather         Lung cancer    Mental illness Paternal Aunt         Anxiety    Asthma Brother         Had when he was a child    Autoimmune disease Maternal Aunt         Graves disease, Still's disease, lupus, celiac disease    Autoimmune disease Maternal Aunt         Chron's disease, Celiac  disease       Social History     Occupational History    Not on file   Tobacco Use    Smoking status: Never     Passive exposure: Yes    Smokeless tobacco: Never   Vaping Use    Vaping status: Never Used   Substance and Sexual Activity    Alcohol use: Not Currently     Comment: Maybe one drink every 3 or so months    Drug use: Yes     Frequency: 7.0 times per week     Types: Marijuana     Comment: Medical marijuana patient, smokes every day    Sexual activity: Yes     Partners: Male     Birth control/protection: Condom Male       Allergies   Allergen Reactions    Bee Venom Swelling    Norgestim-Eth Estrad Triphasic Abdominal Pain and Hives    Norgestimate-Eth Estradiol Other (See Comments)    Poison Ivy Extract Hives, Palpitations, Rash, Swelling and Tachycardia         Current Outpatient Medications:     ALPRAZolam (XANAX) 0.25 mg tablet, , Disp: , Rfl:     busPIRone (BUSPAR) 10 mg tablet, , Disp: , Rfl:     leuprolide (LUPRON DEPOT) 3.75 mg injection, Inject 3.75 mg into a muscle every 28 days, Disp: 1 each, Rfl: 5    norethindrone (Aygestin) 5 mg tablet, Take one pill by mouth continuously, Disp: 30 tablet, Rfl: 3    prazosin (MINIPRESS) 1 mg capsule, Take 1 mg by mouth daily at bedtime, Disp: , Rfl:       Robin Reis PA-C

## 2024-10-16 ENCOUNTER — HOSPITAL ENCOUNTER (OUTPATIENT)
Dept: MRI IMAGING | Facility: HOSPITAL | Age: 29
Discharge: HOME | End: 2024-10-16
Payer: COMMERCIAL

## 2024-10-16 DIAGNOSIS — M25.461 EFFUSION OF RIGHT KNEE: ICD-10-CM

## 2024-10-16 DIAGNOSIS — M23.91 ACUTE INTERNAL DERANGEMENT OF RIGHT KNEE: ICD-10-CM

## 2024-10-16 DIAGNOSIS — M25.561 ACUTE PAIN OF RIGHT KNEE: ICD-10-CM

## 2024-10-16 DIAGNOSIS — M25.561 KNEE MENISCUS PAIN, RIGHT: ICD-10-CM

## 2024-10-16 PROCEDURE — 73721 MRI JNT OF LWR EXTRE W/O DYE: CPT
